# Patient Record
Sex: MALE | Race: BLACK OR AFRICAN AMERICAN
[De-identification: names, ages, dates, MRNs, and addresses within clinical notes are randomized per-mention and may not be internally consistent; named-entity substitution may affect disease eponyms.]

---

## 2019-11-04 ENCOUNTER — HOSPITAL ENCOUNTER (EMERGENCY)
Dept: HOSPITAL 61 - ER | Age: 54
Discharge: HOME | End: 2019-11-04
Payer: SELF-PAY

## 2019-11-04 VITALS — WEIGHT: 184 LBS | HEIGHT: 73 IN | BODY MASS INDEX: 24.39 KG/M2

## 2019-11-04 VITALS — SYSTOLIC BLOOD PRESSURE: 118 MMHG | DIASTOLIC BLOOD PRESSURE: 74 MMHG
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
## 2019-11-04 DIAGNOSIS — S70.12XA: Primary | ICD-10-CM

## 2019-11-04 PROCEDURE — 99281 EMR DPT VST MAYX REQ PHY/QHP: CPT
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
